# Patient Record
Sex: MALE | Race: WHITE | URBAN - METROPOLITAN AREA
[De-identification: names, ages, dates, MRNs, and addresses within clinical notes are randomized per-mention and may not be internally consistent; named-entity substitution may affect disease eponyms.]

---
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
## 2025-07-29 ENCOUNTER — OFFICE VISIT (OUTPATIENT)
Dept: INTERNAL MEDICINE CLINIC | Age: 20
End: 2025-07-29

## 2025-07-29 VITALS
DIASTOLIC BLOOD PRESSURE: 60 MMHG | RESPIRATION RATE: 12 BRPM | HEART RATE: 70 BPM | HEIGHT: 61 IN | WEIGHT: 137 LBS | BODY MASS INDEX: 25.86 KG/M2 | SYSTOLIC BLOOD PRESSURE: 100 MMHG

## 2025-07-29 DIAGNOSIS — F79 INTELLECTUAL DISABILITY: ICD-10-CM

## 2025-07-29 DIAGNOSIS — Q92.8: ICD-10-CM

## 2025-07-29 DIAGNOSIS — Z00.00 ROUTINE GENERAL MEDICAL EXAMINATION AT A HEALTH CARE FACILITY: Primary | ICD-10-CM

## 2025-07-29 DIAGNOSIS — F90.2 ATTENTION DEFICIT HYPERACTIVITY DISORDER (ADHD), COMBINED TYPE: ICD-10-CM

## 2025-07-29 PROCEDURE — 99385 PREV VISIT NEW AGE 18-39: CPT | Performed by: INTERNAL MEDICINE

## 2025-07-29 RX ORDER — CLONIDINE HYDROCHLORIDE 0.2 MG/1
0.2 TABLET ORAL 2 TIMES DAILY
COMMUNITY

## 2025-07-29 RX ORDER — ESCITALOPRAM OXALATE 20 MG/1
20 TABLET ORAL DAILY
COMMUNITY

## 2025-07-29 RX ORDER — ESCITALOPRAM OXALATE 5 MG/1
5 TABLET ORAL DAILY
COMMUNITY

## 2025-07-29 RX ORDER — LISDEXAMFETAMINE DIMESYLATE 50 MG/1
50 CAPSULE ORAL EVERY MORNING
COMMUNITY

## 2025-07-29 RX ORDER — GUANFACINE 4 MG/1
4 TABLET, EXTENDED RELEASE ORAL DAILY
COMMUNITY

## 2025-07-29 ASSESSMENT — ENCOUNTER SYMPTOMS
BACK PAIN: 0
NAUSEA: 0
ABDOMINAL PAIN: 0
WHEEZING: 0
RHINORRHEA: 0
SHORTNESS OF BREATH: 0
VOMITING: 0

## 2025-07-29 ASSESSMENT — PATIENT HEALTH QUESTIONNAIRE - PHQ9
SUM OF ALL RESPONSES TO PHQ QUESTIONS 1-9: 0
2. FEELING DOWN, DEPRESSED OR HOPELESS: NOT AT ALL
1. LITTLE INTEREST OR PLEASURE IN DOING THINGS: NOT AT ALL
SUM OF ALL RESPONSES TO PHQ QUESTIONS 1-9: 0

## 2025-07-29 NOTE — PROGRESS NOTES
2025    Ralf Estrada (:  2005) is a 19 y.o. male, here for a preventive medicine evaluation.    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    He has ADHD    He has intellectual disability.      Review of Systems   Constitutional:  Negative for activity change and appetite change.   HENT:  Negative for postnasal drip and rhinorrhea.    Respiratory:  Negative for shortness of breath and wheezing.    Cardiovascular:  Negative for chest pain, palpitations and leg swelling.   Gastrointestinal:  Negative for abdominal pain, nausea and vomiting.   Genitourinary:  Negative for difficulty urinating and frequency.   Musculoskeletal:  Negative for back pain and joint swelling.   Skin:  Negative for rash.   Neurological:  Negative for light-headedness.   Psychiatric/Behavioral:  Negative for sleep disturbance. The patient is nervous/anxious.        Prior to Visit Medications    Medication Sig Taking? Authorizing Provider   lisdexamfetamine (VYVANSE) 50 MG capsule Take 1 capsule by mouth every morning. Max Daily Amount: 50 mg Yes ProviderPhillip MD   guanFACINE (INTUNIV) 4 MG TB24 extended release tablet Take 1 tablet by mouth daily Yes ProviderPhillip MD   cloNIDine (CATAPRES) 0.2 MG tablet Take 1 tablet by mouth 2 times daily Yes Phillip Cosme MD   escitalopram (LEXAPRO) 20 MG tablet Take 1 tablet by mouth daily Yes Phillip Cosme MD   escitalopram (LEXAPRO) 5 MG tablet Take 1 tablet by mouth daily Yes ProviderPhillip MD        No Known Allergies    Past Medical History:   Diagnosis Date    22q partial trisomy     ADHD     Intellectual disability        Past Surgical History:   Procedure Laterality Date    INGUINAL HERNIA REPAIR Bilateral        Social History     Socioeconomic History    Marital status: